# Patient Record
Sex: MALE | Race: WHITE | NOT HISPANIC OR LATINO | Employment: UNEMPLOYED | URBAN - METROPOLITAN AREA
[De-identification: names, ages, dates, MRNs, and addresses within clinical notes are randomized per-mention and may not be internally consistent; named-entity substitution may affect disease eponyms.]

---

## 2024-04-13 ENCOUNTER — OFFICE VISIT (OUTPATIENT)
Dept: URGENT CARE | Facility: CLINIC | Age: 24
End: 2024-04-13
Payer: MEDICAID

## 2024-04-13 VITALS
WEIGHT: 191 LBS | HEART RATE: 78 BPM | SYSTOLIC BLOOD PRESSURE: 125 MMHG | OXYGEN SATURATION: 98 % | RESPIRATION RATE: 20 BRPM | HEIGHT: 71 IN | TEMPERATURE: 98 F | BODY MASS INDEX: 26.74 KG/M2 | DIASTOLIC BLOOD PRESSURE: 79 MMHG

## 2024-04-13 DIAGNOSIS — Z76.89 ENCOUNTER TO ESTABLISH CARE: ICD-10-CM

## 2024-04-13 DIAGNOSIS — H60.90 OTITIS EXTERNA, UNSPECIFIED CHRONICITY, UNSPECIFIED LATERALITY, UNSPECIFIED TYPE: Primary | ICD-10-CM

## 2024-04-13 DIAGNOSIS — H61.23 BILATERAL HEARING LOSS DUE TO CERUMEN IMPACTION: ICD-10-CM

## 2024-04-13 PROCEDURE — 99214 OFFICE O/P EST MOD 30 MIN: CPT | Mod: PBBFAC,25

## 2024-04-13 PROCEDURE — 69209 REMOVE IMPACTED EAR WAX UNI: CPT | Mod: PBBFAC,50

## 2024-04-13 PROCEDURE — 99203 OFFICE O/P NEW LOW 30 MIN: CPT | Mod: S$PBB,25,,

## 2024-04-13 PROCEDURE — 69210 REMOVE IMPACTED EAR WAX UNI: CPT | Mod: S$PBB,,,

## 2024-04-13 RX ORDER — OFLOXACIN 3 MG/ML
5 SOLUTION AURICULAR (OTIC) 2 TIMES DAILY
Qty: 10 ML | Refills: 0 | Status: SHIPPED | OUTPATIENT
Start: 2024-04-13 | End: 2024-04-20

## 2024-04-13 RX ORDER — SPIRONOLACTONE 50 MG/1
50 TABLET, FILM COATED ORAL 2 TIMES DAILY
COMMUNITY
Start: 2024-03-20

## 2024-04-13 RX ORDER — PROGESTERONE 100 MG/1
100 CAPSULE ORAL
COMMUNITY
Start: 2024-03-20

## 2024-04-13 RX ORDER — ESTRADIOL 2 MG/1
TABLET ORAL
COMMUNITY
Start: 2024-03-20

## 2024-04-13 NOTE — PROGRESS NOTES
"Subjective:       Patient ID: Oscar Wisdom is a 24 y.o. male.    Vitals:  height is 5' 11" (1.803 m) and weight is 86.6 kg (191 lb). His oral temperature is 98.2 °F (36.8 °C). His blood pressure is 125/79 and his pulse is 78. His respiration is 20 and oxygen saturation is 98%.     Chief Complaint: Eye Problem (Bilateral ear pain and loss of hearing.)    Agree with chief complaint, reports has tried Debrox in the past with minimal relief.  Admits to cleaning ears with Q-tips.  Hx of HRT, requesting PCP referral.     Eye Problem   Pertinent negatives include no fever.       Constitution: Negative for chills and fever.   HENT:  Positive for ear pain and hearing loss. Negative for ear discharge, foreign body in ear and tinnitus.        Objective:      Physical Exam   Constitutional: He is oriented to person, place, and time. He is cooperative. He is easily aroused. He does not appear ill. awake  HENT:   Head: Normocephalic and atraumatic.   Ears:   Right Ear: Decreased hearing is noted. impacted cerumen  Left Ear: Decreased hearing is noted. impacted cerumen  Hard dark cerumen in both canals      Comments: Hard dark cerumen in both canals  Eyes: Conjunctivae and lids are normal.   Pulmonary/Chest: Effort normal.   Abdominal: Normal appearance.   Neurological: He is alert, oriented to person, place, and time and easily aroused. GCS eye subscore is 4. GCS verbal subscore is 5. GCS motor subscore is 6.   Skin: Skin is warm, dry and intact.   Psychiatric: His behavior is normal.   Nursing note and vitals reviewed.chaperone present (GIRLFRIEND)           Assessment:       1. Otitis externa, unspecified chronicity, unspecified laterality, unspecified type    2. Bilateral hearing loss due to cerumen impaction    3. Encounter to establish care            Post-procedure Diagnoses   Bilateral hearing loss due to cerumen impaction [H61.23]          Ear Cerumen Removal     Date/Time: 4/13/2024 2:15 PM     Performed by: Reggie" "CODY Sheehan  Authorized by: Sissy Paredes FNP    Time out: Immediately prior to procedure a "time out" was called to verify the correct patient, procedure, equipment, support staff and site/side marked as required.     Consent Done?:  Yes (Verbal)     Local anesthetic:  None  Location details:  Both ears  Procedure type: curette and irrigation    Cerumen  Removal Results:  Cerumen partially removed  Patient tolerance:  Patient tolerated the procedure well with no immediate complications      Hard cerumen bolus removed from bilateral canals, patient tolerated well, states hearing improved, post cerumen removal patient has green like debris/discharge in canals, will treat for otitis external           Plan:     Ear irrigation revealed underlining otitis externa, we will treat with soap ofloxacin for 7 days.  Encouraged patient do not insert foreign objects into canal, keep ears dry, use Debrox for cerumen softener.  PCP referral has been sent.    Otitis externa, unspecified chronicity, unspecified laterality, unspecified type  -     ofloxacin (FLOXIN) 0.3 % otic solution; Place 5 drops into the left ear 2 (two) times daily. for 7 days  Dispense: 10 mL; Refill: 0    Bilateral hearing loss due to cerumen impaction  -     carbamide peroxide (DEBROX) 6.5 % otic solution; Place 5 drops into both ears 2 (two) times daily.  Dispense: 15 mL; Refill: 0    Encounter to establish care  -     Ambulatory referral/consult to Internal Medicine                 "

## 2024-04-13 NOTE — PROCEDURES
"Ear Cerumen Removal    Date/Time: 4/13/2024 2:15 PM    Performed by: Sissy Paredes FNP  Authorized by: Sissy Paredes FNP    Time out: Immediately prior to procedure a "time out" was called to verify the correct patient, procedure, equipment, support staff and site/side marked as required.    Consent Done?:  Yes (Verbal)    Local anesthetic:  None  Location details:  Both ears  Procedure type: curette and irrigation    Cerumen  Removal Results:  Cerumen partially removed  Patient tolerance:  Patient tolerated the procedure well with no immediate complications     Hard cerumen bolus removed from bilateral canals, patient tolerated well, states hearing improved, post cerumen removal patient has green like debris/discharge in canals, will treat for otitis external    "

## 2024-06-14 ENCOUNTER — OFFICE VISIT (OUTPATIENT)
Dept: URGENT CARE | Facility: CLINIC | Age: 24
End: 2024-06-14
Payer: MEDICAID

## 2024-06-14 VITALS
OXYGEN SATURATION: 100 % | DIASTOLIC BLOOD PRESSURE: 103 MMHG | HEART RATE: 88 BPM | RESPIRATION RATE: 16 BRPM | BODY MASS INDEX: 28.02 KG/M2 | SYSTOLIC BLOOD PRESSURE: 150 MMHG | HEIGHT: 71 IN | TEMPERATURE: 98 F | WEIGHT: 200.19 LBS

## 2024-06-14 DIAGNOSIS — R68.84 JAW PAIN: Primary | ICD-10-CM

## 2024-06-14 PROCEDURE — 63600175 PHARM REV CODE 636 W HCPCS

## 2024-06-14 PROCEDURE — 99215 OFFICE O/P EST HI 40 MIN: CPT | Mod: PBBFAC

## 2024-06-14 PROCEDURE — 99213 OFFICE O/P EST LOW 20 MIN: CPT | Mod: S$PBB,,,

## 2024-06-14 RX ORDER — METHOCARBAMOL 750 MG/1
1500 TABLET, FILM COATED ORAL 3 TIMES DAILY
Qty: 42 TABLET | Refills: 0 | Status: SHIPPED | OUTPATIENT
Start: 2024-06-14 | End: 2024-06-24

## 2024-06-14 RX ORDER — KETOROLAC TROMETHAMINE 30 MG/ML
30 INJECTION, SOLUTION INTRAMUSCULAR; INTRAVENOUS
Status: COMPLETED | OUTPATIENT
Start: 2024-06-14 | End: 2024-06-14

## 2024-06-14 RX ORDER — IBUPROFEN 800 MG/1
800 TABLET ORAL 3 TIMES DAILY
Qty: 30 TABLET | Refills: 0 | Status: SHIPPED | OUTPATIENT
Start: 2024-06-14 | End: 2024-06-24

## 2024-06-14 RX ORDER — DEXAMETHASONE SODIUM PHOSPHATE 100 MG/10ML
8 INJECTION INTRAMUSCULAR; INTRAVENOUS
Status: COMPLETED | OUTPATIENT
Start: 2024-06-14 | End: 2024-06-14

## 2024-06-14 RX ADMIN — KETOROLAC TROMETHAMINE 30 MG: 30 INJECTION, SOLUTION INTRAMUSCULAR; INTRAVENOUS at 11:06

## 2024-06-14 RX ADMIN — DEXAMETHASONE SODIUM PHOSPHATE 8 MG: 10 INJECTION INTRAMUSCULAR; INTRAVENOUS at 11:06

## 2024-06-14 NOTE — PROGRESS NOTES
"  Subjective:       Patient ID: Oscar Wisdom is a 24 y.o. male.    Vitals:  height is 5' 11" (1.803 m) and weight is 90.8 kg (200 lb 3.2 oz). His temperature is 98.3 °F (36.8 °C). His blood pressure is 150/103 (abnormal) and his pulse is 88. His respiration is 16 and oxygen saturation is 100%.     Chief Complaint: Jaw Pain (Started x3 nights ago, reports no injury. Patient states it feels locked on right side)    24-year-old male presents to the clinic with jaw pain that started about 3 nights ago.  The patient denies specific injury and reports it feels like it is locked and tight on the right side.  Patient has taken over-the-counter ibuprofen with little relief.  Patient reports pain 9/10 and reports the pain is constant, agonizing, radiating to hairline and chin, and sharp.  Person accompanying patient also reports a jaw clicking and popping present.  Patient denies any history of TMJ dysfunction or problems and has reports a history of chronic pain.  Patient's states she is hypertensive but is currently on hormone therapy that causes hypertension.    All other systems are negative    Chart reviewed    Objective:   Physical Exam   Constitutional: He appears well-developed.  Non-toxic appearance. He does not appear ill. No distress.   HENT:   Head: Normocephalic and atraumatic.   Ears:   Right Ear: Hearing, tympanic membrane, external ear and ear canal normal.   Left Ear: Hearing, tympanic membrane, external ear and ear canal normal.   Nose: Nose normal.   Mouth/Throat: Uvula is midline, oropharynx is clear and moist and mucous membranes are normal. No oral lesions. Normal dentition. No dental abscesses, uvula swelling or dental caries.   Muscle tightness and spasm present to right TMJ and traveling down to right chin.  Slight redness and erythema present.      Comments: Muscle tightness and spasm present to right TMJ and traveling down to right chin.  Slight redness and erythema present.  Cardiovascular: " Regular rhythm.   Pulmonary/Chest: Effort normal and breath sounds normal.   Abdominal: He exhibits no distension. Soft. There is no abdominal tenderness.   Musculoskeletal: Normal range of motion.         General: Normal range of motion.   Skin: Skin is warm, dry and not diaphoretic.   Nursing note and vitals reviewed.      Assessment:     1. Jaw pain            Plan:     Please read patient education material.  Please take medications as discussed and consider potential side effects. Consider heat, over-the-counter topical analgesics, stretches.    Return to urgent care if symptoms are not improving in 3-5 days, immediately if new or worsening symptoms develop.  Follow up with your primary provider.     Jaw pain    Other orders  -     ketorolac injection 30 mg  -     dexAMETHasone injection 8 mg  -     methocarbamoL (ROBAXIN) 750 MG Tab; Take 2 tablets (1,500 mg total) by mouth 3 (three) times daily. for 10 days  Dispense: 42 tablet; Refill: 0  -     ibuprofen (ADVIL,MOTRIN) 800 MG tablet; Take 1 tablet (800 mg total) by mouth 3 (three) times daily. for 10 days  Dispense: 30 tablet; Refill: 0        Please note: This chart was completed via voice to text dictation. It may contain typographical/word recognition errors. If there are any questions, please contact the provider for final clarification.

## 2024-06-24 ENCOUNTER — OFFICE VISIT (OUTPATIENT)
Dept: INTERNAL MEDICINE | Facility: CLINIC | Age: 24
End: 2024-06-24
Payer: MEDICAID

## 2024-06-24 ENCOUNTER — LAB VISIT (OUTPATIENT)
Dept: LAB | Facility: HOSPITAL | Age: 24
End: 2024-06-24
Attending: NURSE PRACTITIONER
Payer: MEDICAID

## 2024-06-24 VITALS
BODY MASS INDEX: 27.87 KG/M2 | DIASTOLIC BLOOD PRESSURE: 82 MMHG | HEIGHT: 71 IN | TEMPERATURE: 99 F | SYSTOLIC BLOOD PRESSURE: 122 MMHG | RESPIRATION RATE: 16 BRPM | HEART RATE: 87 BPM | WEIGHT: 199.06 LBS

## 2024-06-24 DIAGNOSIS — Z11.4 SCREENING FOR HIV (HUMAN IMMUNODEFICIENCY VIRUS): ICD-10-CM

## 2024-06-24 DIAGNOSIS — Z11.3 SCREENING EXAMINATION FOR STD (SEXUALLY TRANSMITTED DISEASE): ICD-10-CM

## 2024-06-24 DIAGNOSIS — M25.50 GENERALIZED JOINT PAIN: ICD-10-CM

## 2024-06-24 DIAGNOSIS — Z00.00 WELLNESS EXAMINATION: Primary | ICD-10-CM

## 2024-06-24 DIAGNOSIS — Z00.00 WELLNESS EXAMINATION: ICD-10-CM

## 2024-06-24 LAB
ALBUMIN SERPL-MCNC: 3.9 G/DL (ref 3.5–5)
ALBUMIN/GLOB SERPL: 1.1 RATIO (ref 1.1–2)
ALP SERPL-CCNC: 78 UNIT/L (ref 40–150)
ALT SERPL-CCNC: 79 UNIT/L (ref 0–55)
ANION GAP SERPL CALC-SCNC: 10 MEQ/L
AST SERPL-CCNC: 28 UNIT/L (ref 5–34)
BACTERIA #/AREA URNS AUTO: ABNORMAL /HPF
BASOPHILS # BLD AUTO: 0.08 X10(3)/MCL
BASOPHILS NFR BLD AUTO: 0.7 %
BILIRUB SERPL-MCNC: 0.3 MG/DL
BILIRUB UR QL STRIP.AUTO: NEGATIVE
BUN SERPL-MCNC: 17.5 MG/DL (ref 8.9–20.6)
C TRACH DNA SPEC QL NAA+PROBE: NOT DETECTED
CALCIUM SERPL-MCNC: 9.8 MG/DL (ref 8.4–10.2)
CHLORIDE SERPL-SCNC: 105 MMOL/L (ref 98–107)
CHOLEST SERPL-MCNC: 279 MG/DL
CHOLEST/HDLC SERPL: 5 {RATIO} (ref 0–5)
CLARITY UR: CLEAR
CO2 SERPL-SCNC: 24 MMOL/L (ref 22–29)
COLOR UR AUTO: YELLOW
CREAT SERPL-MCNC: 0.81 MG/DL (ref 0.73–1.18)
CREAT/UREA NIT SERPL: 22
CRP SERPL-MCNC: 8.8 MG/L
EOSINOPHIL # BLD AUTO: 0.17 X10(3)/MCL (ref 0–0.9)
EOSINOPHIL NFR BLD AUTO: 1.5 %
ERYTHROCYTE [DISTWIDTH] IN BLOOD BY AUTOMATED COUNT: 12.1 % (ref 11.5–17)
ERYTHROCYTE [SEDIMENTATION RATE] IN BLOOD: 9 MM/HR (ref 0–15)
EST. AVERAGE GLUCOSE BLD GHB EST-MCNC: 108.3 MG/DL
GFR SERPLBLD CREATININE-BSD FMLA CKD-EPI: >60 ML/MIN/1.73/M2
GLOBULIN SER-MCNC: 3.6 GM/DL (ref 2.4–3.5)
GLUCOSE SERPL-MCNC: 107 MG/DL (ref 74–100)
GLUCOSE UR QL STRIP: NORMAL
HAV IGM SERPL QL IA: NONREACTIVE
HBA1C MFR BLD: 5.4 %
HBV CORE IGM SERPL QL IA: NONREACTIVE
HBV SURFACE AG SERPL QL IA: NONREACTIVE
HCT VFR BLD AUTO: 41.4 % (ref 42–52)
HCV AB SERPL QL IA: NONREACTIVE
HDLC SERPL-MCNC: 61 MG/DL (ref 35–60)
HGB BLD-MCNC: 14.2 G/DL (ref 14–18)
HGB UR QL STRIP: NEGATIVE
HIV 1+2 AB+HIV1 P24 AG SERPL QL IA: NONREACTIVE
HYALINE CASTS #/AREA URNS LPF: ABNORMAL /LPF
IMM GRANULOCYTES # BLD AUTO: 0.04 X10(3)/MCL (ref 0–0.04)
IMM GRANULOCYTES NFR BLD AUTO: 0.4 %
KETONES UR QL STRIP: NEGATIVE
LDLC SERPL CALC-MCNC: 186 MG/DL (ref 50–140)
LEUKOCYTE ESTERASE UR QL STRIP: NEGATIVE
LYMPHOCYTES # BLD AUTO: 3.4 X10(3)/MCL (ref 0.6–4.6)
LYMPHOCYTES NFR BLD AUTO: 30.4 %
MCH RBC QN AUTO: 30.9 PG (ref 27–31)
MCHC RBC AUTO-ENTMCNC: 34.3 G/DL (ref 33–36)
MCV RBC AUTO: 90.2 FL (ref 80–94)
MONOCYTES # BLD AUTO: 0.72 X10(3)/MCL (ref 0.1–1.3)
MONOCYTES NFR BLD AUTO: 6.4 %
MUCOUS THREADS URNS QL MICRO: ABNORMAL /LPF
N GONORRHOEA DNA SPEC QL NAA+PROBE: NOT DETECTED
NEUTROPHILS # BLD AUTO: 6.76 X10(3)/MCL (ref 2.1–9.2)
NEUTROPHILS NFR BLD AUTO: 60.6 %
NITRITE UR QL STRIP: NEGATIVE
NRBC BLD AUTO-RTO: 0 %
PH UR STRIP: 5.5 [PH]
PLATELET # BLD AUTO: 349 X10(3)/MCL (ref 130–400)
PMV BLD AUTO: 10 FL (ref 7.4–10.4)
POTASSIUM SERPL-SCNC: 4.4 MMOL/L (ref 3.5–5.1)
PROT SERPL-MCNC: 7.5 GM/DL (ref 6.4–8.3)
PROT UR QL STRIP: NEGATIVE
RBC # BLD AUTO: 4.59 X10(6)/MCL (ref 4.7–6.1)
RBC #/AREA URNS AUTO: ABNORMAL /HPF
SODIUM SERPL-SCNC: 139 MMOL/L (ref 136–145)
SOURCE (OHS): NORMAL
SP GR UR STRIP.AUTO: 1.03 (ref 1–1.03)
SQUAMOUS #/AREA URNS LPF: ABNORMAL /HPF
T PALLIDUM AB SER QL: NONREACTIVE
TRIGL SERPL-MCNC: 160 MG/DL (ref 34–140)
TSH SERPL-ACNC: 1.06 UIU/ML (ref 0.35–4.94)
URATE SERPL-MCNC: 5.2 MG/DL (ref 3.5–7.2)
UROBILINOGEN UR STRIP-ACNC: NORMAL
VLDLC SERPL CALC-MCNC: 32 MG/DL
WBC # BLD AUTO: 11.17 X10(3)/MCL (ref 4.5–11.5)
WBC #/AREA URNS AUTO: ABNORMAL /HPF

## 2024-06-24 PROCEDURE — 80061 LIPID PANEL: CPT

## 2024-06-24 PROCEDURE — 80053 COMPREHEN METABOLIC PANEL: CPT

## 2024-06-24 PROCEDURE — 84443 ASSAY THYROID STIM HORMONE: CPT

## 2024-06-24 PROCEDURE — 86039 ANTINUCLEAR ANTIBODIES (ANA): CPT

## 2024-06-24 PROCEDURE — 86200 CCP ANTIBODY: CPT

## 2024-06-24 PROCEDURE — 36415 COLL VENOUS BLD VENIPUNCTURE: CPT

## 2024-06-24 PROCEDURE — 3074F SYST BP LT 130 MM HG: CPT | Mod: CPTII,,, | Performed by: NURSE PRACTITIONER

## 2024-06-24 PROCEDURE — 1160F RVW MEDS BY RX/DR IN RCRD: CPT | Mod: CPTII,,, | Performed by: NURSE PRACTITIONER

## 2024-06-24 PROCEDURE — 80074 ACUTE HEPATITIS PANEL: CPT

## 2024-06-24 PROCEDURE — 85025 COMPLETE CBC W/AUTO DIFF WBC: CPT

## 2024-06-24 PROCEDURE — 86431 RHEUMATOID FACTOR QUANT: CPT

## 2024-06-24 PROCEDURE — 86140 C-REACTIVE PROTEIN: CPT

## 2024-06-24 PROCEDURE — 1159F MED LIST DOCD IN RCRD: CPT | Mod: CPTII,,, | Performed by: NURSE PRACTITIONER

## 2024-06-24 PROCEDURE — 3008F BODY MASS INDEX DOCD: CPT | Mod: CPTII,,, | Performed by: NURSE PRACTITIONER

## 2024-06-24 PROCEDURE — 87491 CHLMYD TRACH DNA AMP PROBE: CPT

## 2024-06-24 PROCEDURE — 86780 TREPONEMA PALLIDUM: CPT

## 2024-06-24 PROCEDURE — 3079F DIAST BP 80-89 MM HG: CPT | Mod: CPTII,,, | Performed by: NURSE PRACTITIONER

## 2024-06-24 PROCEDURE — 81015 MICROSCOPIC EXAM OF URINE: CPT

## 2024-06-24 PROCEDURE — 84550 ASSAY OF BLOOD/URIC ACID: CPT

## 2024-06-24 PROCEDURE — 85652 RBC SED RATE AUTOMATED: CPT

## 2024-06-24 PROCEDURE — 87389 HIV-1 AG W/HIV-1&-2 AB AG IA: CPT

## 2024-06-24 PROCEDURE — 99214 OFFICE O/P EST MOD 30 MIN: CPT | Mod: PBBFAC | Performed by: NURSE PRACTITIONER

## 2024-06-24 PROCEDURE — 83036 HEMOGLOBIN GLYCOSYLATED A1C: CPT

## 2024-06-24 PROCEDURE — 99204 OFFICE O/P NEW MOD 45 MIN: CPT | Mod: S$PBB,,, | Performed by: NURSE PRACTITIONER

## 2024-06-24 RX ORDER — METHOCARBAMOL 750 MG/1
1500 TABLET, FILM COATED ORAL 3 TIMES DAILY PRN
Qty: 90 TABLET | Refills: 2 | Status: SHIPPED | OUTPATIENT
Start: 2024-06-24 | End: 2025-03-21

## 2024-06-24 NOTE — PROGRESS NOTES
"CODY Szymanski   [facility]  [address] [facility] [address]      PATIENT NAME: Oscar Wisdom  : 2000  DATE: 24  MRN: [id number]      Reason for Visit / Chief Complaint: Establish Care       History of Present Illness / Problem Focused Workflow     Oscar Wisdom presents to the clinic with Establish Care     25 yo male here to establish care.   Followed by [facility] in [place], followed every 2 - 3 months. Dr. BEDOYA Screening - 24  Lung Cancer Screening - Lung Rads  Eye Screening -   Dental Screening -  Wellness Screening - 24  Pt here today to establish care. Pt is agreeable to routine wellness labs today.   Pt reports with chronic pain since a very young age. Reports with chronic joint pain & muscle pain to include areas of bilateral feet, bilateral knees, bilateral wrists, and more recently progressed to neck and jaw are very recently Pt did x-rays of legs / knees area without about findings   Reports feeling of "electric feeling" when flare ups occur. Flare ups occur every couple of week ( can occur a few times a week), will last anywhere from 30 minutes to a few hours and then will decrease in intensity or change to a different are of the body. Pt is agreeable today to orders for autoimmune lab work. The pt reports robaxin is helpful. Pt has been ambulating with a cane for a while now. Will f/u with results next week.           Review of Systems     Review of Systems   Constitutional: Negative.    HENT: Negative.     Eyes: Negative.    Respiratory: Negative.     Cardiovascular: Negative.    Gastrointestinal: Negative.    Endocrine: Negative.    Genitourinary: Negative.    Musculoskeletal:  Positive for arthralgias and joint swelling.   Neurological: Negative.    Psychiatric/Behavioral: Negative.           Medications and Allergies     Medications  Medication List with Changes/Refills " "  Current Medications    CARBAMIDE PEROXIDE (DEBROX) 6.5 % OTIC SOLUTION    Place 5 drops into both ears 2 (two) times daily.    ESTRADIOL (ESTRACE) 2 MG TABLET    Take by mouth.    IBUPROFEN (ADVIL,MOTRIN) 800 MG TABLET    Take 1 tablet (800 mg total) by mouth 3 (three) times daily. for 10 days    PROGESTERONE (PROMETRIUM) 100 MG CAPSULE    Take 100 mg by mouth.    SPIRONOLACTONE (ALDACTONE) 50 MG TABLET    Take 50 mg by mouth 2 (two) times daily.   Changed and/or Refilled Medications    Modified Medication Previous Medication    METHOCARBAMOL (ROBAXIN) 750 MG TAB methocarbamoL (ROBAXIN) 750 MG Tab       Take 2 tablets (1,500 mg total) by mouth 3 (three) times daily as needed (Muscle Spasms).    Take 2 tablets (1,500 mg total) by mouth 3 (three) times daily. for 10 days         Allergies  Review of patient's allergies indicates:   Allergen Reactions    Tree nut Anaphylaxis       Physical Examination     Vitals:    06/24/24 1219   BP: 122/82   Pulse: 87   Resp: 16   Temp: 98.5 °F (36.9 °C)     Physical Exam  Vitals reviewed.   Constitutional:       Appearance: Normal appearance. He is normal weight.   HENT:      Head: Normocephalic.   Cardiovascular:      Rate and Rhythm: Normal rate and regular rhythm.      Pulses: Normal pulses.      Heart sounds: Normal heart sounds.   Pulmonary:      Effort: Pulmonary effort is normal.      Breath sounds: Normal breath sounds.   Abdominal:      General: Abdomen is flat.      Palpations: Abdomen is soft.   Musculoskeletal:         General: Normal range of motion.      Cervical back: Normal range of motion.   Skin:     General: Skin is warm and dry.   Neurological:      Mental Status: He is alert.   Psychiatric:         Mood and Affect: Mood normal.           Results   No results found for: "WBC", "RBC", "HGB", "HCT", "MCV", "MCH", "MCHC", "RDW", "PLT", "MPV", "GRAN", "LYMPH", "MONO", "EOS", "BASO", "EOSINOPHIL", "BASOPHIL"  No results found for: "NA", "K", "CL", "CO2", "GLU", " ""BUN", "CREATININE", "CALCIUM", "PROT", "ALBUMIN", "BILITOT", "ALKPHOS", "AST", "ALT", "ANIONGAP", "ESTGFRAFRICA", "EGFRNONAA"  No results found for: "CHOL"  No results found for: "HDL"  No results found for: "TRIG"  No results found for: "VLDL"  No results found for: "LDL"  No results found for: "TSH"  No results found for: "PHUR", "SPECGRAV", "PROTEINUA", "GLUCUA", "KETONESU", "OCCULTUA", "NITRITE", "LEUKOCYTESUR"  No results found for: "HGBA1C"        Assessment         ICD-10-CM ICD-9-CM   1. Wellness examination  Z00.00 V70.0   2. Screening for HIV (human immunodeficiency virus)  Z11.4 V73.89   3. Screening examination for STD (sexually transmitted disease)  Z11.3 V74.5   4. Generalized joint pain  M25.50 719.40       Plan      Problem List Items Addressed This Visit          Orthopedic    Generalized joint pain    Current Assessment & Plan     Autoimmune labs today  Continue RX robaxin prn            Relevant Medications    methocarbamoL (ROBAXIN) 750 MG Tab    Other Relevant Orders    RHEUMATOID ARTHRITIS PANEL    Uric Acid    C-Reactive Protein    Sedimentation rate    Antinuclear Antibody (EDDIE), HEp-2, IgG       Other    Wellness examination - Primary    Current Assessment & Plan     Pt wellness visit completed today with appropriate lab work.    Topics Reviewed / Updated  Immunizations Discussed  Dicussed Healthy Diet &   Encouraged to exercise 3 x weekly  Increase Water Intake  Eat more fruits and vegetables  Avoid soda & alcohol           Relevant Orders    TSH    Hemoglobin A1C    Comprehensive Metabolic Panel    Urinalysis, Reflex to Urine Culture    Lipid Panel    CBC Auto Differential     Other Visit Diagnoses       Screening for HIV (human immunodeficiency virus)        Relevant Orders    HIV 1/2 Ag/Ab (4th Gen)    Screening examination for STD (sexually transmitted disease)        Relevant Orders    Chlamydia/GC, PCR    SYPHILIS ANTIBODY (WITH REFLEX RPR)    Hepatitis Panel, Acute      "       Future Appointments   Date Time Provider Department Center   7/5/2024 10:40 AM [facility]            Signature:     [facility]  [address] [facility] [address]    Date of encounter: 6/24/24

## 2024-06-26 LAB
AR ANA INTERPRETIVE COMMENT: NORMAL
AR ANTINUCLEAR ANTIBODY (ANA), HEP-2, IGG: NORMAL

## 2024-06-27 LAB
CYCLIC CITRULLINATED PEPTIDE (CCP) (OHS): 0.5 U/ML
RHEUMATOID FACTOR IGA QUANTITATIVE (OLG): 1.8 IU/ML
RHEUMATOID FACTOR IGM QUANTITATIVE (OLG): 1.5 IU/ML

## 2024-07-05 ENCOUNTER — OFFICE VISIT (OUTPATIENT)
Dept: INTERNAL MEDICINE | Facility: CLINIC | Age: 24
End: 2024-07-05
Payer: MEDICAID

## 2024-07-05 DIAGNOSIS — M25.50 GENERALIZED JOINT PAIN: ICD-10-CM

## 2024-07-05 DIAGNOSIS — E78.2 MIXED HYPERLIPIDEMIA: Primary | ICD-10-CM

## 2024-07-05 RX ORDER — ATORVASTATIN CALCIUM 20 MG/1
20 TABLET, FILM COATED ORAL NIGHTLY
Qty: 90 TABLET | Refills: 0 | Status: SHIPPED | OUTPATIENT
Start: 2024-07-05 | End: 2024-10-03

## 2024-07-05 NOTE — PROGRESS NOTES
"  CODY Szymanski   [facility]  [address] [facility] [address]      PATIENT NAME: Oscar Wisdom  : 2000  DATE: 24  MRN: [id number]      Patient PCP Information       Provider PCP Type    CODY Szymanski [facility]            Reason for Visit / Chief Complaint: Health Maintenance (Lab review )       History of Present Illness / Problem Focused Workflow     Oscar Wisdom presents to the clinic with Health Maintenance (Lab review )     25 yo male here to establish care.   Followed by [facility] in [place], followed every 2 - 3 months. Dr. BEDOYA Screening - 24  Lung Cancer Screening - Lung Rads  Eye Screening -   Dental Screening -  Wellness Screening - 24  Pt here today to establish care. Pt is agreeable to routine wellness labs today.   Pt reports with chronic pain since a very young age. Reports with chronic joint pain & muscle pain to include areas of bilateral feet, bilateral knees, bilateral wrists, and more recently progressed to neck and jaw are very recently Pt did x-rays of legs / knees area without about findings   Reports feeling of "electric feeling" when flare ups occur. Flare ups occur every couple of week ( can occur a few times a week), will last anywhere from 30 minutes to a few hours and then will decrease in intensity or change to a different are of the body. Pt is agreeable today to orders for autoimmune lab work. The pt reports robaxin is helpful. Pt has been ambulating with a cane for a while now. Will f/u with results next week.     2024  Patient here today via virtual for lab review.  Labs reviewed and discussed with the patient without any questions or clinically significant concerns at this time.  Reviewed patient has autoimmune workup to include all negative findings.  Per patient last office visit discussion regarding flare ups of electric feeling that occur every " couple weeks the last 30 minutes to few hours, we will order EMG testing today for evaluation, we will follow up once results are available.    Discussion with patient regarding elevated FLP.  Discussed with patient initiation of statin therapy along with conservative treatment measures to include low fat diet, reduced consumption of processed foods, and cardiovascular exercise on a weekly basis.  Discussed consequences of uncontrolled cholesterol levels to include heart disease, heart attack, and stroke.  Patient verbalized understanding.  We will start atorvastatin 20 mg every evening today.  We will follow up again in about 2 months via virtual for lab review with fasting labs completed prior to appointment.  Patient has no other questions or concerns at this time.            Review of Systems     Review of Systems   Constitutional: Negative.    HENT: Negative.     Eyes: Negative.    Respiratory: Negative.     Cardiovascular: Negative.    Gastrointestinal: Negative.    Endocrine: Negative.    Genitourinary: Negative.    Neurological: Negative.    Psychiatric/Behavioral: Negative.           Medications and Allergies     Medications  Current Outpatient Medications   Medication Instructions    atorvastatin (LIPITOR) 20 mg, Oral, Nightly, For High Choleserol    carbamide peroxide (DEBROX) 6.5 % otic solution 5 drops, Both Ears, 2 times daily    estradioL (ESTRACE) 2 MG tablet Oral    methocarbamoL (ROBAXIN) 1,500 mg, Oral, 3 times daily PRN    progesterone (PROMETRIUM) 100 mg, Oral    spironolactone (ALDACTONE) 50 mg, Oral, 2 times daily         Allergies  Review of patient's allergies indicates:   Allergen Reactions    Tree nut Anaphylaxis       Physical Examination     There were no vitals taken for this visit.    Physical Exam  HENT:      Right Ear: Hearing normal.      Left Ear: Hearing normal.   Neurological:      Mental Status: He is alert and oriented to person, place, and time.   Psychiatric:         Mood and  Affect: Mood normal.           Results     Lab Results   Component Value Date    WBC 11.17 06/24/2024    RBC 4.59 (L) 06/24/2024    HGB 14.2 06/24/2024    HCT 41.4 (L) 06/24/2024    MCV 90.2 06/24/2024    MCH 30.9 06/24/2024    MCHC 34.3 06/24/2024    RDW 12.1 06/24/2024     06/24/2024    MPV 10.0 06/24/2024     CMP  Sodium   Date Value Ref Range Status   06/24/2024 139 136 - 145 mmol/L Final     Potassium   Date Value Ref Range Status   06/24/2024 4.4 3.5 - 5.1 mmol/L Final     Chloride   Date Value Ref Range Status   06/24/2024 105 98 - 107 mmol/L Final     CO2   Date Value Ref Range Status   06/24/2024 24 22 - 29 mmol/L Final     Blood Urea Nitrogen   Date Value Ref Range Status   06/24/2024 17.5 8.9 - 20.6 mg/dL Final     Creatinine   Date Value Ref Range Status   06/24/2024 0.81 0.73 - 1.18 mg/dL Final     Calcium   Date Value Ref Range Status   06/24/2024 9.8 8.4 - 10.2 mg/dL Final     Albumin   Date Value Ref Range Status   06/24/2024 3.9 3.5 - 5.0 g/dL Final     Bilirubin Total   Date Value Ref Range Status   06/24/2024 0.3 <=1.5 mg/dL Final     ALP   Date Value Ref Range Status   06/24/2024 78 40 - 150 unit/L Final     AST   Date Value Ref Range Status   06/24/2024 28 5 - 34 unit/L Final     ALT   Date Value Ref Range Status   06/24/2024 79 (H) 0 - 55 unit/L Final     Lab Results   Component Value Date    CHOL 279 (H) 06/24/2024     Lab Results   Component Value Date    HDL 61 (H) 06/24/2024     Lab Results   Component Value Date    TRIG 160 (H) 06/24/2024     Lab Results   Component Value Date    VLDL 32 06/24/2024     Lab Results   Component Value Date    .00 (H) 06/24/2024     Lab Results   Component Value Date    TSH 1.059 06/24/2024         Assessment        ICD-10-CM ICD-9-CM   1. Mixed hyperlipidemia  E78.2 272.2   2. Generalized joint pain  M25.50 719.40        Plan      Problem List Items Addressed This Visit          Cardiac/Vascular    Mixed hyperlipidemia - Primary    Current  Assessment & Plan     FLP elevted  Start RX Atorvastatin 20 mg qhs  2 month f/u with labs    Follow a low cholesterol, low saturated fat diet with less than 200 mg of cholesterol a day.   Avoid fried foods and high saturated fats (pacheco, sausage, cookies, cakes, chips, cheese, whole milk, butter, mayonnaise, creamy dressings, gravy, stew, gumbo, boudin, cracklins and cream sauces).  Add flax seed or fish oil supplements to diet.   Increase dietary fiber.   Regular exercise improves cholesterol levels.  Physical activity 5 times a week for 30 minutes per day (or 150 minutes per week).   Stressed importance of dietary modifications.    Lab Results   Component Value Date    CHOL 279 (H) 06/24/2024     Lab Results   Component Value Date    HDL 61 (H) 06/24/2024     Lab Results   Component Value Date    TRIG 160 (H) 06/24/2024     Lab Results   Component Value Date    VLDL 32 06/24/2024     Lab Results   Component Value Date    .00 (H) 06/24/2024              Relevant Medications    atorvastatin (LIPITOR) 20 MG tablet    Other Relevant Orders    Basic Metabolic Panel    Urinalysis, Reflex to Urine Culture    Lipid Panel       Orthopedic    Generalized joint pain    Current Assessment & Plan     Autoimmune Work Up Negative  EMG testing ordered   F/U with results          Relevant Orders    Ambulatory referral/consult to Neurology       No future appointments.       No follow-ups on file.      Signature:     [facility]  [address] [facility] [address]    Date of encounter: 7/5/24      The patient location is: home  The chief complaint leading to consultation is: lab review    Visit type: audiovisual    Face to Face time with patient: 20  30 minutes of total time spent on the encounter, which includes face to face time and non-face to face time preparing to see the patient (eg, review of tests), Obtaining and/or reviewing separately obtained history,  Documenting clinical information in the electronic or other health record, Independently interpreting results (not separately reported) and communicating results to the patient/family/caregiver, or Care coordination (not separately reported).         Each patient to whom he or she provides medical services by telemedicine is:  (1) informed of the relationship between the physician and patient and the respective role of any other health care provider with respect to management of the patient; and (2) notified that he or she may decline to receive medical services by telemedicine and may withdraw from such care at any time.    Notes:

## 2024-07-05 NOTE — ASSESSMENT & PLAN NOTE
FLP elevted  Start RX Atorvastatin 20 mg qhs  2 month f/u with labs    Follow a low cholesterol, low saturated fat diet with less than 200 mg of cholesterol a day.   Avoid fried foods and high saturated fats (pacheco, sausage, cookies, cakes, chips, cheese, whole milk, butter, mayonnaise, creamy dressings, gravy, stew, gumbo, boudin, cracklins and cream sauces).  Add flax seed or fish oil supplements to diet.   Increase dietary fiber.   Regular exercise improves cholesterol levels.  Physical activity 5 times a week for 30 minutes per day (or 150 minutes per week).   Stressed importance of dietary modifications.    Lab Results   Component Value Date    CHOL 279 (H) 06/24/2024     Lab Results   Component Value Date    HDL 61 (H) 06/24/2024     Lab Results   Component Value Date    TRIG 160 (H) 06/24/2024     Lab Results   Component Value Date    VLDL 32 06/24/2024     Lab Results   Component Value Date    .00 (H) 06/24/2024